# Patient Record
Sex: MALE | Race: ASIAN | NOT HISPANIC OR LATINO | Employment: FULL TIME | URBAN - METROPOLITAN AREA
[De-identification: names, ages, dates, MRNs, and addresses within clinical notes are randomized per-mention and may not be internally consistent; named-entity substitution may affect disease eponyms.]

---

## 2018-12-27 ENCOUNTER — OFFICE VISIT (OUTPATIENT)
Dept: FAMILY MEDICINE | Facility: CLINIC | Age: 28
End: 2018-12-27
Payer: COMMERCIAL

## 2018-12-27 ENCOUNTER — RECORDS - HEALTHEAST (OUTPATIENT)
Dept: ADMINISTRATIVE | Facility: OTHER | Age: 28
End: 2018-12-27

## 2018-12-27 VITALS
BODY MASS INDEX: 25.57 KG/M2 | HEIGHT: 64 IN | RESPIRATION RATE: 18 BRPM | TEMPERATURE: 98.4 F | WEIGHT: 149.8 LBS | SYSTOLIC BLOOD PRESSURE: 130 MMHG | OXYGEN SATURATION: 94 % | HEART RATE: 79 BPM | DIASTOLIC BLOOD PRESSURE: 86 MMHG

## 2018-12-27 DIAGNOSIS — J06.9 VIRAL URI WITH COUGH: Primary | ICD-10-CM

## 2018-12-27 DIAGNOSIS — Z00.00 HEALTHCARE MAINTENANCE: ICD-10-CM

## 2018-12-27 ASSESSMENT — MIFFLIN-ST. JEOR: SCORE: 1570.49

## 2018-12-27 NOTE — NURSING NOTE
Injectable influenza vaccine documentation    1. Has the patient received the information for the influenza vaccine? YES    2. Does the patient have a severe allergy to eggs (Patients with a severe egg allergy should be assessed by a medical provider, RN, or clinical pharmacist. If they receive the influenza vaccine, please have them observed for 15 minutes.)? No    3. Has the patient had an allergic reaction to previous influenza vaccines? No    4. Has the patient had any severe allergic reactions to past influenza vaccines ? No       5. Does patient have a history of Guillain-Alexandria syndrome? No      Based on responses above, I administered the influenza vaccine.  Jazlyn Gomez MA

## 2018-12-27 NOTE — PROGRESS NOTES
"HPI    Sore throat:  Started about a week ago  Was having some sweats - those resolved about 5d ago  Now cough and sore throat  No nasal congestion  Cough is productive  No fevers - never checked temperature when having sweat  Cough drops seem to be helping  Not coughing up any blood  Has been around a lot of sick people    Wants to establish clinic with [facility] - close to his house   Waiting for insurance to switch    Past Medical History  Medical conditions - None  Surgeries - None  Medications - None  Hospitalizations - Never  Allergies - NKA  Prior PCP - Unsure  Last outpatient PCP visit - Unsure    Family History  Mom - HTN  Dad - HTN  Siblings - Healthy  Children - None      Social History  Works as: [employer]  ADL: independent  Ambulation: independent  Lives with: parents  Smoking: Never  Alcohol: Drinks several times a week, 4-5 shots per  Illicit drug use: Uses MDMA every week or two    Complete ROS negative unless mentioned above in HPI    OBJECTIVE    Vitals  /86   Pulse 79   Temp 98.4  F (36.9  C) (Oral)   Resp 18   Ht 1.626 m (5' 4\")   Wt 67.9 kg (149 lb 12.8 oz)   SpO2 94%   BMI 25.71 kg/m        Physical Exam  General: No acute distress  Ears: canals patent, TM within normal limits  Eyes: EOMI, PERRLA  Nose: nasal mucosa moist, clear nasal discharche  Oral cavity: moist mucosa, no tonsillar exudates, tonsils not enlarged, erythematous pharynx   Neck: good ROM, supple, no apparent tracheal deviation, no adenopathy or tenderness  Respiratory: CTA bilaterally, no wheezes/rhonchi/rhales appreciated, no respiratory distress  Chest wall: No chest wall tenderness  Back: no paraspinal tenderness, no spinal tenderness, no vertebral step offs appreciated  Abdomen: soft, non-distended, non-tender, normoactive bowel sounds  Extremities: no cyanosis, no edema, capillary refill <2 seconds, well perfused  Neuro: no focal deficits noted, reflexes within normal limits  Psyche: " appropriate affect     Labs    Recommend Lipid screen, but will get done at [facility]    ASSESSMENT/PLAN    1. Viral URI with cough  Continue with conservative management. Discussed using tea with honey along with his cough drops and syrup. Does not have any points for Centor criteria so do not think strep swab is warranted. Fevers resolved.     2. Healthcare maintenance  Would recommend lipid screen, but patient would like to establish with [facility] for location. Waiting for insurance to switch. UTD on tetanus.   -Flu shot today    3. Alcohol/substance use  Patient open about his alcohol and MDMA use. Not affecting his work but still counseled on use - recommend decreasing alcohol use and stopping MDMA.       Precepted patient with Dr. Miryam Tee MD   [facility]  Pager # [phone]

## 2018-12-27 NOTE — PROGRESS NOTES
I have personally reviewed the history and examination as documented by Dr. Tee.  I was present during key portions of the visit and agree with the assessment and plan as documented for 28 yr old male here to establish care and for pharyngitis. Precautions given. Anticipatory guidance given. Health maintenance reviewed and immunizations given.    Tobias Witt MD  December 27, 2018  11:14 AM

## 2019-01-16 ENCOUNTER — OFFICE VISIT - HEALTHEAST (OUTPATIENT)
Dept: FAMILY MEDICINE | Facility: CLINIC | Age: 29
End: 2019-01-16

## 2019-01-16 ENCOUNTER — COMMUNICATION - HEALTHEAST (OUTPATIENT)
Dept: TELEHEALTH | Facility: CLINIC | Age: 29
End: 2019-01-16

## 2019-01-16 DIAGNOSIS — J40 BRONCHITIS: ICD-10-CM

## 2019-01-16 DIAGNOSIS — J02.9 PHARYNGITIS, UNSPECIFIED ETIOLOGY: ICD-10-CM

## 2019-01-16 ASSESSMENT — MIFFLIN-ST. JEOR: SCORE: 1583.61

## 2019-12-24 ENCOUNTER — OFFICE VISIT - HEALTHEAST (OUTPATIENT)
Dept: FAMILY MEDICINE | Facility: CLINIC | Age: 29
End: 2019-12-24

## 2019-12-24 DIAGNOSIS — J11.1 INFLUENZA-LIKE ILLNESS: ICD-10-CM

## 2019-12-24 DIAGNOSIS — R50.9 FEVER, UNSPECIFIED FEVER CAUSE: ICD-10-CM

## 2019-12-24 DIAGNOSIS — R07.0 THROAT PAIN: ICD-10-CM

## 2019-12-24 LAB
DEPRECATED S PYO AG THROAT QL EIA: NORMAL
FLUAV AG SPEC QL IA: NORMAL
FLUBV AG SPEC QL IA: NORMAL

## 2019-12-24 ASSESSMENT — MIFFLIN-ST. JEOR: SCORE: 1639.86

## 2019-12-25 LAB — GROUP A STREP BY PCR: NORMAL

## 2021-05-05 ENCOUNTER — AMBULATORY - HEALTHEAST (OUTPATIENT)
Dept: NURSING | Facility: CLINIC | Age: 31
End: 2021-05-05

## 2021-05-12 ENCOUNTER — OFFICE VISIT - HEALTHEAST (OUTPATIENT)
Dept: FAMILY MEDICINE | Facility: CLINIC | Age: 31
End: 2021-05-12

## 2021-05-12 DIAGNOSIS — R19.4 FREQUENT BOWEL MOVEMENTS: ICD-10-CM

## 2021-05-12 DIAGNOSIS — M54.50 CHRONIC BILATERAL LOW BACK PAIN WITHOUT SCIATICA: ICD-10-CM

## 2021-05-12 DIAGNOSIS — L30.9 DERMATITIS: ICD-10-CM

## 2021-05-12 DIAGNOSIS — G89.29 CHRONIC BILATERAL LOW BACK PAIN WITHOUT SCIATICA: ICD-10-CM

## 2021-05-12 DIAGNOSIS — Z00.00 ANNUAL PHYSICAL EXAM: ICD-10-CM

## 2021-05-12 LAB
CHOLEST SERPL-MCNC: 199 MG/DL
FASTING STATUS PATIENT QL REPORTED: YES
FASTING STATUS PATIENT QL REPORTED: YES
GLUCOSE BLD-MCNC: 79 MG/DL (ref 70–99)
HDLC SERPL-MCNC: 46 MG/DL
LDLC SERPL CALC-MCNC: 102 MG/DL
TRIGL SERPL-MCNC: 255 MG/DL

## 2021-05-12 RX ORDER — CLOTRIMAZOLE 1 %
CREAM (GRAM) TOPICAL
Qty: 24 G | Refills: 0 | Status: SHIPPED | OUTPATIENT
Start: 2021-05-12 | End: 2022-10-14

## 2021-05-12 ASSESSMENT — PATIENT HEALTH QUESTIONNAIRE - PHQ9: SUM OF ALL RESPONSES TO PHQ QUESTIONS 1-9: 20

## 2021-05-12 ASSESSMENT — MIFFLIN-ST. JEOR: SCORE: 1619.9

## 2021-05-13 ENCOUNTER — AMBULATORY - HEALTHEAST (OUTPATIENT)
Dept: LAB | Facility: CLINIC | Age: 31
End: 2021-05-13

## 2021-05-13 DIAGNOSIS — R19.4 FREQUENT BOWEL MOVEMENTS: ICD-10-CM

## 2021-05-14 LAB — H PYLORI AG STL QL IA: NEGATIVE

## 2021-05-27 VITALS
SYSTOLIC BLOOD PRESSURE: 112 MMHG | WEIGHT: 164 LBS | DIASTOLIC BLOOD PRESSURE: 70 MMHG | HEART RATE: 60 BPM | HEIGHT: 64 IN | BODY MASS INDEX: 28 KG/M2

## 2021-05-27 ASSESSMENT — PATIENT HEALTH QUESTIONNAIRE - PHQ9: SUM OF ALL RESPONSES TO PHQ QUESTIONS 1-9: 20

## 2021-06-02 ENCOUNTER — AMBULATORY - HEALTHEAST (OUTPATIENT)
Dept: NURSING | Facility: CLINIC | Age: 31
End: 2021-06-02

## 2021-06-02 VITALS — WEIGHT: 156 LBS | BODY MASS INDEX: 26.63 KG/M2 | HEIGHT: 64 IN

## 2021-06-04 VITALS
TEMPERATURE: 100.3 F | OXYGEN SATURATION: 97 % | BODY MASS INDEX: 28.75 KG/M2 | WEIGHT: 168.4 LBS | DIASTOLIC BLOOD PRESSURE: 78 MMHG | HEART RATE: 106 BPM | RESPIRATION RATE: 18 BRPM | HEIGHT: 64 IN | SYSTOLIC BLOOD PRESSURE: 126 MMHG

## 2021-06-04 NOTE — PROGRESS NOTES
"Chief Complaint   Patient presents with     Sore Throat     X2 days      Cough     X2 days      Fever     Chills. Did not check temps.      Ear Pain     Both ears        HPI:  Amilcar Sheriff is a 29 y.o. male who presents today with flu like symptoms since yesterday. He reports, nasal congestion, cough, ear pain/pressure, sore throat and fatigue. He feels feverish but has no body aches. He thinks his friend has something similar and was exposed to him. He denies shortness of breath, wheeze, chest pain, n/v/d/ abdominal pain.     Problem List:  There are no relevant problems documented for this patient.      No past medical history on file.    No current outpatient medications on file prior to visit.     No current facility-administered medications on file prior to visit.         Social History     Tobacco Use     Smoking status: Never Smoker     Smokeless tobacco: Never Used   Substance Use Topics     Alcohol use: Not on file       ROS:  As stated in HPI    Vitals:    12/24/19 1022   BP: 126/78   Patient Site: Right Arm   Patient Position: Sitting   Cuff Size: Adult Large   Pulse: (!) 106   Resp: 18   Temp: 100.3  F (37.9  C)   TempSrc: Oral   SpO2: 97%   Weight: 168 lb 6.4 oz (76.4 kg)   Height: 5' 4\" (1.626 m)       Physical Exam:  General: Alert, No apparent distress, Cooperative  SKIN: dry, warm, no rash or lesions seen in areas of exposed skin  HENT: HEAD: ATNC,   EYES: Conjunctiva clear, EOMI  EARS: TM pearly with out erythema or bulging bilaterally  NOSE: Nares partially occluded with mucopurulent matter, mucosa non erythematous, turbinates non swollen, sinuses discomfort  THROAT: No Posterior oropharynx erythema, tonsils 0+ bilaterally, no exudate, uvula midline, non occluded  NECK: Supple, non tender, no cervical adenopathy  LUNGS: No respiratory distress, retraction or flaring. clear lung sounds in all fields, no wheezes, rales, crackles or rhonchi   CV: tachycardia, no murmurs, rubs or gallops, no " cyanosis  NUERO: AOx3, normal mentation  PSYCH: normal mood and affect        No notes on file    Labs:  Recent Results (from the past 72 hour(s))   Rapid Strep A Screen-Throat swab   Result Value Ref Range    Rapid Strep A Antigen No Group A Strep detected, presumptive negative No Group A Strep detected, presumptive negative   Group A Strep, RNA Direct Detection, Throat   Result Value Ref Range    Group A Strep by PCR No Group A Strep rRNA detected No Group A Strep rRNA detected   Influenza A/B Rapid Test- Nasal Swab   Result Value Ref Range    Influenza  A, Rapid Antigen No Influenza A antigen detected No Influenza A antigen detected    Influenza B, Rapid Antigen No Influenza B antigen detected No Influenza B antigen detected         No results found.    Assessment and Plan:  1. Throat pain  Rapid Strep A Screen-Throat swab    Group A Strep, RNA Direct Detection, Throat   2. Fever, unspecified fever cause  Influenza A/B Rapid Test- Nasal Swab      Rapid flu negative but symptoms highly suggestive of influenza-like illness.  Will treat as such and prescribe oseltamivir as they likely will benefit from this antiviral.  Discussed that benefits outweigh the risk at this time.  Patient will follow-up if they are not improving ove expected course of the influenza recovery arc.   Simon Soto PA-C

## 2021-06-16 PROBLEM — E78.1 HYPERTRIGLYCERIDEMIA: Status: ACTIVE | Noted: 2021-05-29

## 2021-06-17 NOTE — PROGRESS NOTES
SUBJECTIVE:  Say Gina Sheriff is a 31 y.o. male here for a physical exam.    Other concerns:  1.  Low back pain.  He was seen at [facility] urgent care for low back pain on 3/2/2021.  I reviewed that note today.  I diagnosed him with acute midline low back pain without sciatica.  I reviewed x-ray report from that date.  X-ray did not show any acute or significant findings.  He was discharged with what sounds like a Medrol dose pack, naproxen, and a muscle relaxer.  He says those medicines did help, but when he ran out symptoms did eventually return.  He also went to 3 physical therapy sessions.  They help somewhat.  He is trying to do some exercises at home as well.  He was also seen at [facility] orthopedics on 3/3/2021.  I reviewed that note today.  They had essentially the same plan.  They said he also in addition to low back pain with slight degenerative changes, he had tight hamstring and hip flexors.  They asked him to follow-up in 3 weeks and consider an MRI if symptoms or not improving.  Patient found out that his insurance would not cover visits with this provider, so he needs to see someone new for his back.    2.  Skin rash.  He has a rash that comes and goes under his arms.  Now it is starting to come in the groin area.  It is itchy.  He does have just normal over-the-counter lotion that seems to help a little bit.  He is wondering if anything can be done for this.    3.  Frequent bowel movements.  He says he usually has a bowel movement 3 times a day, usually somewhat liquidy.  Is been that way for a long time.  No personal or family history of H. pylori.  He cannot specifically connected with any foods or other triggers.    4.  Possible depression.  He feels like he has been more depressed recently.  He says he stopped doing social things, stopped going out with his friends.  Of course some of this is due to restrictions from pandemic.  He just went through a break-up, which he thinks is the primary reason  "for his change in mood.  He is working from home, that's going well.  PHQ-9 = 18.  He answered \"several days\" to the question thought of harming myself that occurred to me.  We reviewed that specifically today.  He says he more has some passive thoughts about what it would be like if he were not here.  He has no active plan.  He thinks that he just needs time to adjust.    There is no problem list on file for this patient.      Current Outpatient Medications:      clotrimazole (LOTRIMIN) 1 % cream, Apply to affected areas 2 times a day, as needed for rash., Disp: 24 g, Rfl: 0    History reviewed. No pertinent past medical history.    History reviewed. No pertinent surgical history.    Social History     Occupational History     Not on file   Tobacco Use     Smoking status: Passive Smoke Exposure - Never Smoker     Smokeless tobacco: Never Used   Substance and Sexual Activity     Alcohol use: Not on file     Drug use: Not on file     Sexual activity: Not on file       No family history on file.    REVIEW OF SYSTEMS:  Complete review of systems was discussed with patient and is negative except as noted above.    OBJECTIVE:  Vitals:    05/12/21 0821   BP: 112/70   Pulse: 60     Body mass index is 28.15 kg/m .    GENERAL:  Patient alert and oriented x 3, in no apparent distress, appropriately groomed with normal affect  HEENT, lymphatic, cardiac, pulmonary, abdomen, musculoskeletal, neuro exams were completed today and grossly normal.  : Normal male exam, no testicular masses, no lesions  SKIN: Mild erythematous rash present on right axillary area, no significant rash on the left side, no significant rash in the right inguinal area    Results for orders placed or performed in visit on 05/12/21   Glucose   Result Value Ref Range    Glucose 79 70 - 99 mg/dL    Patient Fasting > 8hrs? Yes    Other labs pending.    ASSESSMENT & PLAN:    1. Annual physical exam  Health maintenance discussed with patient is appropriate for " age and risk factors.  I will follow-up with screening labs.  - Lipid Cascade  - Glucose    2. Chronic bilateral low back pain without sciatica  Likely musculoskeletal in origin.  No red flags on exam or history.  He was following with [facility] orthopedics.  However his insurance will not cover that.  We discussed doing just physical therapy versus spine care.  He would like to see spine care.  Referral placed today.  Continue with symptomatic treatment for now.  - Ambulatory referral to Sp[facility]    3. Dermatitis  Suspect tinea based on locations where it is bothering him.  Start with topical medication follow-up in 2 to 4 weeks.  Discussed other lifestyle modifications.  - clotrimazole (LOTRIMIN) 1 % cream; Apply to affected areas 2 times a day, as needed for rash.  Dispense: 24 g; Refill: 0    4. Frequent bowel movements  No known exposures to H. pylori.  We discussed monitoring versus testing.  He would like to test.  He was provided with stool kit before he left.  Also discussed monitoring to see if certain foods make things worse, such as dairy.  - H. pylori Antigen, Stool(HPSAG); Future    5.  Adjustment disorder with depressed mood  Notes he feels like he might be mildly depressed recently.  He went through a break-up.  He says he is stopped doing things and going out with friends.  He thinks he just needs some time to better.  He is working from home and that is going fine.  He declines offer for therapy.  He declines medication.  Patient denies any active thoughts of wanting to hurt him/herself or others and does contract for safety.  If his symptoms worsen, or he would like to use something else for treatment, he will contact us.    The following high BMI interventions were performed this visit: encouragement to exercise and lifestyle education regarding diet    This dictation uses voice recognition software, which may contain typographical errors.

## 2021-06-23 NOTE — PROGRESS NOTES
"Subjective: Patient comes in clinic for the first time this 28-year-old male has a history of cough congestion he said sore throat is now coughing up some yellowish phlegm he did have one episode of slight blood that he coughed up a week or so ago    Symptoms been going on for 4 weeks initially got worse then got better now got worse again.  He does not smoke does drink alcohol    Past medical history no surgeries or hospitalizations or medical conditions no allergies to medicines he is taken some Mucinex over-the-counter otherwise negative    The family history is noncontributory.    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    No additional concerns or issues no vomiting or diarrhea he did have some chills in the beginning no fever at this time    Tobacco status: He  reports that  has never smoked. he has never used smokeless tobacco.    There are no active problems to display for this patient.      Current Outpatient Medications   Medication Sig Dispense Refill     azithromycin (ZITHROMAX Z-SUNSHINE) 250 MG tablet Take 2 tablets (500 mg) on  Day 1,  followed by 1 tablet (250 mg) once daily on Days 2 through 5. 6 tablet 0     No current facility-administered medications for this visit.        ROS:   10 point review of systems positive as outlined otherwise negative    Objective:    /84 (Patient Site: Right Arm, Patient Position: Sitting, Cuff Size: Adult Regular)   Pulse 82   Temp 97.2  F (36.2  C) (Oral)   Resp 16   Ht 5' 4\" (1.626 m)   Wt 156 lb (70.8 kg)   SpO2 97%   BMI 26.78 kg/m    Body mass index is 26.78 kg/m .      General appearance no acute distress    Neck with minimal anterior adenopathy oropharynx had slight erythema no exudate    Canals and TMs normal    Nose with some mild congestion    Lungs coarse breath sounds no rales or rhonchi O2 sat 97%    Heart was regular rate in the 80s    Skin was normal no rashes    Extremities without edema.        No results found for this or any previous " visit.    Assessment:  1. Bronchitis  azithromycin (ZITHROMAX Z-SUNSHINE) 250 MG tablet   2. Pharyngitis, unspecified etiology       Patient was treated with azithromycin pack push fluids get rest follow-up if not improved    Plan: As discussed    This transcription uses voice recognition software, which may contain typographical errors.

## 2021-08-24 ENCOUNTER — OFFICE VISIT (OUTPATIENT)
Dept: FAMILY MEDICINE | Facility: CLINIC | Age: 31
End: 2021-08-24
Payer: COMMERCIAL

## 2021-08-24 VITALS
DIASTOLIC BLOOD PRESSURE: 74 MMHG | OXYGEN SATURATION: 97 % | SYSTOLIC BLOOD PRESSURE: 120 MMHG | HEART RATE: 58 BPM | RESPIRATION RATE: 16 BRPM

## 2021-08-24 DIAGNOSIS — M25.532 LEFT WRIST PAIN: Primary | ICD-10-CM

## 2021-08-24 PROCEDURE — 99213 OFFICE O/P EST LOW 20 MIN: CPT | Performed by: PHYSICIAN ASSISTANT

## 2021-08-24 RX ORDER — NAPROXEN 500 MG/1
500 TABLET ORAL 2 TIMES DAILY WITH MEALS
Qty: 60 TABLET | Refills: 0 | Status: SHIPPED | OUTPATIENT
Start: 2021-08-24 | End: 2022-10-14

## 2021-08-24 ASSESSMENT — ENCOUNTER SYMPTOMS
JOINT SWELLING: 0
WEAKNESS: 0
CHILLS: 0
NUMBNESS: 0
FEVER: 0
COLOR CHANGE: 0
ARTHRALGIAS: 1

## 2021-08-24 NOTE — PATIENT INSTRUCTIONS
1) Take Naproxen every 12 hours as needed for pain. If this is not sufficient for pain control it may be combined with Tylenol up to 1000 mg four times a day.   2) Ice to the affected area 20 minutes three times daily.  3) Wear wrist brace during the day. If you note any pain in the morning then wear your wrist brace while you sleep too.   4) Follow up in 2 week if not improving, sooner if worsening.

## 2021-08-24 NOTE — PROGRESS NOTES
Patient presents with:  Wrist Pain: x3wks, L wrist, don't recall any injury      Clinical Decision Making: Suspect tendinitis of the flexor tendon of the wrist. No physical exam findings consistent with carpal tunnel and patient denies any numbness or tingling. Pain is more on the medial aspect not over the carpal tunnel directly. No findings indicative of gout. Low suspicion for any fractures, as this is nontraumatic. Recommend immobilization in nonthumb spica brace to help with tendinitis protection.      ICD-10-CM    1. Left wrist pain  M25.532 Wrist/Arm/Hand Supplies Order for DME - ONLY FOR DME     naproxen (NAPROSYN) 500 MG tablet       Patient Instructions   1) Take Naproxen every 12 hours as needed for pain. If this is not sufficient for pain control it may be combined with Tylenol up to 1000 mg four times a day.   2) Ice to the affected area 20 minutes three times daily.  3) Wear wrist brace during the day. If you note any pain in the morning then wear your wrist brace while you sleep too.   4) Follow up in 2 week if not improving, sooner if worsening.        HPI:  Amilcar Sheriff is a 31 year old male who presents today complaining of left wrist pain for the past 3 weeks. No known injury. No swelling, redness, or deformity noted. He is otherwise been feeling well and healthy. 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 He denies any numbness or tingling of the fingers. He has not any weakness. He states that certain movements or lifting aggravate his symptoms. Supination seems to be worst movement. He denies taking any medications for his symptoms. He thought maybe it was related to lifting dumbbells.    History obtained from the patient.    Problem List:  2021-05: Hypertriglyceridemia      No past medical history on file.    Social History     Tobacco Use     Smoking status: Passive Smoke Exposure - Never Smoker     Smokeless tobacco: Never Used   Substance Use Topics     Alcohol use: Not on file        Review of Systems   Constitutional: Negative for chills and fever.   Musculoskeletal: Positive for arthralgias. Negative for joint swelling.   Skin: Negative for color change and rash.   Neurological: Negative for weakness and numbness.   All other systems reviewed and are negative.      Vitals:    08/24/21 1030   BP: 120/74   Pulse: 58   Resp: 16   SpO2: 97%       Physical Exam  Vitals and nursing note reviewed.   Constitutional:       General: He is not in acute distress.     Appearance: He is not toxic-appearing or diaphoretic.   HENT:      Head: Normocephalic and atraumatic.      Right Ear: External ear normal.      Left Ear: External ear normal.   Eyes:      Conjunctiva/sclera: Conjunctivae normal.   Pulmonary:      Effort: Pulmonary effort is normal. No respiratory distress.   Musculoskeletal:      Left wrist: Tenderness (Medial Flexeril tendon of the wrist.) present. No swelling, deformity, effusion, lacerations, bony tenderness, snuff box tenderness or crepitus. Normal range of motion. Normal pulse.      Comments: Negative Hamida sign   Skin:     General: Skin is warm.      Findings: No erythema or rash.   Neurological:      Mental Status: He is alert.   Psychiatric:         Mood and Affect: Mood normal.         Behavior: Behavior normal.         Thought Content: Thought content normal.         Judgment: Judgment normal.       At the end of the encounter, I discussed results, diagnosis, medications. Discussed red flags for immediate return to clinic/ER, as well as indications for follow up if no improvement. Patient understood and agreed to plan. Patient was stable for discharge.

## 2021-09-19 ENCOUNTER — HEALTH MAINTENANCE LETTER (OUTPATIENT)
Age: 31
End: 2021-09-19

## 2021-09-30 ENCOUNTER — TELEPHONE (OUTPATIENT)
Dept: FAMILY MEDICINE | Facility: CLINIC | Age: 31
End: 2021-09-30

## 2021-09-30 NOTE — TELEPHONE ENCOUNTER
There are Covid shots in the pt's chart, but they were not done here. Can I print off [facility] for him?

## 2021-09-30 NOTE — TELEPHONE ENCOUNTER
Reason for Call:  Other     Detailed comments: pt would like documentation  stating he had Covid Vaccines done  He will  at   Phone Number Patient can be reached at: Home number on file [phone] (home)    Best Time: anytime  Can we leave a detailed message on this number? YES    Call taken on 9/30/2021 at 2:12 PM by Shikha Reno

## 2022-06-26 ENCOUNTER — HEALTH MAINTENANCE LETTER (OUTPATIENT)
Age: 32
End: 2022-06-26

## 2022-10-12 ENCOUNTER — HOSPITAL ENCOUNTER (EMERGENCY)
Facility: HOSPITAL | Age: 32
Discharge: HOME | End: 2022-10-12
Admitting: PHYSICIAN ASSISTANT
Payer: COMMERCIAL

## 2022-10-12 ENCOUNTER — E-VISIT (OUTPATIENT)
Dept: FAMILY MEDICINE | Facility: CLINIC | Age: 32
End: 2022-10-12
Payer: COMMERCIAL

## 2022-10-12 VITALS
WEIGHT: 175 LBS | SYSTOLIC BLOOD PRESSURE: 132 MMHG | HEART RATE: 59 BPM | RESPIRATION RATE: 16 BRPM | OXYGEN SATURATION: 98 % | TEMPERATURE: 98 F | DIASTOLIC BLOOD PRESSURE: 78 MMHG | BODY MASS INDEX: 31.01 KG/M2 | HEIGHT: 63 IN

## 2022-10-12 DIAGNOSIS — S39.012A BACK STRAIN, INITIAL ENCOUNTER: ICD-10-CM

## 2022-10-12 DIAGNOSIS — M54.50 LOW BACK PAIN, UNSPECIFIED BACK PAIN LATERALITY, UNSPECIFIED CHRONICITY, UNSPECIFIED WHETHER SCIATICA PRESENT: Primary | ICD-10-CM

## 2022-10-12 PROCEDURE — 250N000013 HC RX MED GY IP 250 OP 250 PS 637: Performed by: PHYSICIAN ASSISTANT

## 2022-10-12 PROCEDURE — 96372 THER/PROPH/DIAG INJ SC/IM: CPT | Performed by: PHYSICIAN ASSISTANT

## 2022-10-12 PROCEDURE — 250N000011 HC RX IP 250 OP 636: Performed by: PHYSICIAN ASSISTANT

## 2022-10-12 PROCEDURE — 99284 EMERGENCY DEPT VISIT MOD MDM: CPT

## 2022-10-12 PROCEDURE — 99207 PR NON-BILLABLE SERV PER CHARTING: CPT | Performed by: PHYSICIAN ASSISTANT

## 2022-10-12 RX ORDER — NAPROXEN 500 MG/1
500 TABLET ORAL 2 TIMES DAILY WITH MEALS
Qty: 28 TABLET | Refills: 0 | Status: SHIPPED | OUTPATIENT
Start: 2022-10-12 | End: 2022-10-26

## 2022-10-12 RX ORDER — DIAZEPAM 5 MG
5 TABLET ORAL ONCE
Status: COMPLETED | OUTPATIENT
Start: 2022-10-12 | End: 2022-10-12

## 2022-10-12 RX ORDER — KETOROLAC TROMETHAMINE 30 MG/ML
30 INJECTION, SOLUTION INTRAMUSCULAR; INTRAVENOUS ONCE
Status: COMPLETED | OUTPATIENT
Start: 2022-10-12 | End: 2022-10-12

## 2022-10-12 RX ORDER — DIAZEPAM 5 MG
5 TABLET ORAL EVERY 6 HOURS PRN
Qty: 10 TABLET | Refills: 0 | Status: SHIPPED | OUTPATIENT
Start: 2022-10-12 | End: 2023-08-29

## 2022-10-12 RX ORDER — ACETAMINOPHEN 325 MG/1
975 TABLET ORAL ONCE
Status: COMPLETED | OUTPATIENT
Start: 2022-10-12 | End: 2022-10-12

## 2022-10-12 RX ADMIN — DIAZEPAM 5 MG: 5 TABLET ORAL at 22:23

## 2022-10-12 RX ADMIN — KETOROLAC TROMETHAMINE 30 MG: 30 INJECTION, SOLUTION INTRAMUSCULAR at 22:23

## 2022-10-12 RX ADMIN — ACETAMINOPHEN 975 MG: 325 TABLET, FILM COATED ORAL at 22:23

## 2022-10-12 ASSESSMENT — ENCOUNTER SYMPTOMS
BACK PAIN: 1
FEVER: 0
CHILLS: 0
WEAKNESS: 0
NUMBNESS: 0

## 2022-10-12 NOTE — PATIENT INSTRUCTIONS
Thank you for choosing us for your care. Based on the information provided, you need to be seen in person for an office visit.  Please go to Urgent Care, the ER, or schedule an office visit with a provider, depending on how bothersome your pain is.     You will not be charged for this eVisit.

## 2022-10-13 NOTE — ED TRIAGE NOTES
"Patient arrives with back pain that began 2 days ago. Started off as generalize soreness, today having spasms. Took 2 ibuprofen prior to arrival without relief. Has had one prior episode in the past and was told to \"stretch\" and had that episode after driving 6+ hours with poor posture. Believes this is related to poor posture playing video games on laptop. Denies any recent fall or injury to area.      Triage Assessment       Row Name 10/12/22 2020       Triage Assessment (Adult)    Airway WDL WDL       Respiratory WDL    Respiratory WDL WDL       Skin Circulation/Temperature WDL    Skin Circulation/Temperature WDL WDL       Cardiac WDL    Cardiac WDL WDL       Peripheral/Neurovascular WDL    Peripheral Neurovascular WDL WDL       Cognitive/Neuro/Behavioral WDL    Cognitive/Neuro/Behavioral WDL WDL                  "

## 2022-10-13 NOTE — ED PROVIDER NOTES
Emergency Department Encounter   NAME: Amilcar Sheriff ; AGE: 32 year old male ; YOB: 1990 ; MRN: [id number] ; PCP: [facility]   ED PROVIDER: Alisa Sandoval PA-C    Evaluation Date & Time:   No admission date for patient encounter.    CHIEF COMPLAINT:  Back Pain      Impression and Plan   MDM: Amilcar Sheriff is a 32 year old male with a pertinent history of hypertriglyceridemia, who presents to the ED by private vehicle for evaluation of back pain.  The patient presents to the emergency department for evaluation of nontraumatic low back pain, that has been sore for several days, although is now more stiff and spasming after sitting in the same position for several hours while playing videogames.  Upon arrival to the ER, he appears uncomfortable, stiff with certain movements, and has a slow gait.  He has tenderness over his bilateral lumbar paraspinal musculature.  No recent falls or trauma to the back, or midline tenderness or step-offs.  Nothing to suggest compression fracture.  No IV drug abuse, immunosuppression, fevers or infectious symptoms.  No concerns at this time for epidural abscess, discitis, or osteomyelitis.  Nothing to suggest epidural hematoma.  Patient is neurovascularly intact and he is not experiencing any red flag symptoms to suggest cauda equina syndrome.  With only several days of pain in a young healthy 32-year-old with tenderness over the muscle, and spasms, no indication for emergent spinal imaging.  Suspect symptoms are due to back muscular strain and spasm from position and posterior.  Patient was given IM Toradol and Valium with some relief in the ER.  We will discharge him home with a course of naproxen, oral Valium, and plan for gentle stretching, ice and heat for home.  He does have a primary care clinic, and we discussed plan for follow-up in 1 to 2 weeks to make sure symptoms are improving as well as concerning signs and symptoms to return to the  "ER.  He verbalized understanding is comfortable plan.  Discharged home in good condition.    ED COURSE:  11:06 PM I met and introduced myself to the patient. I gathered initial history and performed my physical exam. We discussed plan for discharge, follow up and reasons to return to the ED.     At the conclusion of the encounter I discussed the results of all the tests and the disposition. The questions were answered. The patient or family acknowledged understanding and was agreeable with the care plan.    FINAL IMPRESSION:    ICD-10-CM    1. Back strain, initial encounter  S39.012A             MEDICATIONS GIVEN IN THE EMERGENCY DEPARTMENT:  Medications   ketorolac (TORADOL) injection 30 mg (30 mg Intramuscular Given 10/12/22 2223)   diazepam (VALIUM) tablet 5 mg (5 mg Oral Given 10/12/22 2223)   acetaminophen (TYLENOL) tablet 975 mg (975 mg Oral Given 10/12/22 2223)         NEW PRESCRIPTIONS STARTED AT TODAY'S ED VISIT:  New Prescriptions    DIAZEPAM (VALIUM) 5 MG TABLET    Take 1 tablet (5 mg) by mouth every 6 hours as needed for sleep or muscle spasms    NAPROXEN (NAPROSYN) 500 MG TABLET    Take 1 tablet (500 mg) by mouth 2 times daily (with meals) for 14 days         HPI   Patient information was obtained from: Patient    Use of Intrepreter: N/A    Amilcar Sheriff is a 32 year old male with a pertinent history of hypertriglyceridemia, who presents to the ED by private vehicle for evaluation of back pain.     The patient presents to the emergency department for evaluation of low back pain.  He states that he experiences low back pain from time to time, and has had some soreness to the area over the past several days.  Today, he was sitting with his feet up, and his back against the wall, \"rigoberto\" for several hours.  When he went to stand up, he had much more severe pain in his back as well as pulling and tightness.  He has been experiencing intermittent spasm of the area, and tried ibuprofen at home with minimal " "relief.  He has not had any recent fevers, chills, unintentional weight loss, or night sweats.  No IV drug abuse.  No numbness in his groin, loss of control of his bladder or bowel, or weakness or numbness in his legs.  No recent falls or trauma to the back.  No urinary symptoms.    REVIEW OF SYSTEMS:  Review of Systems   Constitutional: Negative for chills and fever.   Genitourinary: Negative.    Musculoskeletal: Positive for back pain.   Skin: Negative for rash.   Neurological: Negative for weakness and numbness.   All other systems reviewed and are negative.        Medical History     No past medical history on file.    Past Surgical History:   Procedure Laterality Date     NO HISTORY OF SURGERY         No family history on file.    Social History     Tobacco Use     Smoking status: Passive Smoke Exposure - Never Smoker     Smokeless tobacco: Never       diazepam (VALIUM) 5 MG tablet  naproxen (NAPROSYN) 500 MG tablet  clotrimazole (LOTRIMIN) 1 % cream  naproxen (NAPROSYN) 500 MG tablet          Physical Exam     First Vitals:  Patient Vitals for the past 24 hrs:   BP Temp Temp src Pulse Resp SpO2 Height Weight   10/12/22 2026 -- -- -- -- -- -- 1.6 m (5' 3\") 79.4 kg (175 lb)   10/12/22 2018 134/81 98  F (36.7  C) Temporal 79 19 94 % -- --         PHYSICAL EXAM:   Physical Exam  Vitals and nursing note reviewed.   Constitutional:       General: He is not in acute distress.     Appearance: He is not toxic-appearing.      Comments: Appears uncomfortable. Stiff with movement. Slow gait.    HENT:      Head: Normocephalic.   Eyes:      Conjunctiva/sclera: Conjunctivae normal.   Pulmonary:      Effort: Pulmonary effort is normal.   Musculoskeletal:      Comments: No midline spinal tenderness or palpable bony step-offs.  No rashes or skin lesions to the back.  He does have tenderness over his bilateral lumbar paraspinal musculature.  No active spasm.  Dorsiflexion and plantarflexion are intact.  5 out of 5 strength " against resistance with hip flexion, knee flexion extension.  Sensation to light touch intact bilateral lower extremities.  2+ patellar reflexes.   Neurological:      Mental Status: He is alert.             Results     LAB:  All pertinent labs reviewed and interpreted  Labs Ordered and Resulted from Time of ED Arrival to Time of ED Departure - No data to display    RADIOLOGY:  No orders to display       Alisa Sandoval PA-C   Emergency Medicine   [facility] EMERGENCY DEPARTMENT       Alisa Sandoval PA-C  10/12/22 1901

## 2022-10-13 NOTE — DISCHARGE INSTRUCTIONS
As we discussed, I suspect your pain today is due to a strain of the muscles in your low back.  Please continue to move throughout the day and attempt gentle stretching.  Use ice or heat.  You may use naproxen and Tylenol for pain relief at home.  I will also send you home with a prescription for Valium which is a muscle relaxant.  This is a strong medicine and can make you drowsy.  Please do not take this or working, driving, or operating heavy machinery.  If it anytime you develop a fever, numbness in your groin, loss of control of your bladder or bowel, weakness or numbness in your legs, or any new or concerning symptoms please return to the ER for further evaluation.  Otherwise, please follow-up in your primary care clinic to make sure this is improving within the next 1 to 2 weeks.

## 2022-10-14 ENCOUNTER — ANCILLARY PROCEDURE (OUTPATIENT)
Dept: GENERAL RADIOLOGY | Facility: CLINIC | Age: 32
End: 2022-10-14
Attending: FAMILY MEDICINE
Payer: COMMERCIAL

## 2022-10-14 ENCOUNTER — OFFICE VISIT (OUTPATIENT)
Dept: FAMILY MEDICINE | Facility: CLINIC | Age: 32
End: 2022-10-14
Payer: COMMERCIAL

## 2022-10-14 VITALS
SYSTOLIC BLOOD PRESSURE: 115 MMHG | WEIGHT: 173 LBS | DIASTOLIC BLOOD PRESSURE: 76 MMHG | TEMPERATURE: 97.8 F | HEART RATE: 62 BPM | BODY MASS INDEX: 30.65 KG/M2 | RESPIRATION RATE: 20 BRPM | OXYGEN SATURATION: 97 %

## 2022-10-14 DIAGNOSIS — Z11.59 NEED FOR HEPATITIS C SCREENING TEST: ICD-10-CM

## 2022-10-14 DIAGNOSIS — M25.551 HIP PAIN, RIGHT: ICD-10-CM

## 2022-10-14 DIAGNOSIS — M54.50 ACUTE BILATERAL LOW BACK PAIN WITHOUT SCIATICA: Primary | ICD-10-CM

## 2022-10-14 DIAGNOSIS — Z11.4 SCREENING FOR HIV (HUMAN IMMUNODEFICIENCY VIRUS): ICD-10-CM

## 2022-10-14 PROCEDURE — 90471 IMMUNIZATION ADMIN: CPT | Performed by: FAMILY MEDICINE

## 2022-10-14 PROCEDURE — 90715 TDAP VACCINE 7 YRS/> IM: CPT | Performed by: FAMILY MEDICINE

## 2022-10-14 PROCEDURE — 99213 OFFICE O/P EST LOW 20 MIN: CPT | Mod: 25 | Performed by: FAMILY MEDICINE

## 2022-10-14 PROCEDURE — 73502 X-RAY EXAM HIP UNI 2-3 VIEWS: CPT | Mod: TC | Performed by: RADIOLOGY

## 2022-10-14 NOTE — PROGRESS NOTES
Office Visit  [facility]  Date of Service: Oct 14, 2022      Subjective   Amilcar Sheriff is a 32 year old male who presents for   Chief Complaint   Patient presents with     Back Pain     Started 2 days ago.     Amilcar Fuentes is here today for evaluation of lower back pain for two days.    He first noticed back pain at age 21.  Whenever he is in good shape physically, he gets left-sided sciatica.  He was a runner in adolescence and symptoms started gradually.  They have never gone away entirely, though symptoms are less severe when he is not in good physical condition.  He notes that he was in the [employer] and would develop numbness of his left great toe when standing at attention for long periods of time.    In 2019, he had his first episode of back pain that was similar to what he has today.  He was playing with kids, throwing them up in the air, and the next day he woke up with a sore back.  There was no obvious injury.  Since that episode of pain in 2019, whenever he does a dead-hang, he has severe pain in the same spot.     In 2020, he had a recurrent episode after driving back from [place].  This pain was the same.  Muscles felt tight and he was unable to walk due to the severity of pain.    Since that time, he has had some mild soreness of the low back, nothing severe, except when doing a dead-hang.    The most recent episode started acutely on 10/12/2022.  He was sitting with legs up in bed, back straight with a pillow supporting his back.  When he stood up, he had severe pain in the lower back.  He states that the pain was 6-7 out of 10 initially and gradually got worse over a couple of hours.  After laying on his stomach for a while, the pain went away, but that when he started to walk around again the pain came back.  He was seen in the emergency room on 10/12/2022 due to the severity of the pain.    He states that the quality of the pain is difficult to explain,  but feels sharp or throbbing.  Certain movements tend to exacerbate the pain.  He has no bowel or bladder dysfunction.  No weakness or numbness.    Treatment: When he first had this type of pain in 2019, he saw an orthopedic specialist and was told to do exercises.  He did physical therapy at that time and has continued the exercises.    He has no history of significant back injury.    He notes that for about the last 6 months he has not been able to do squats because of right inguinal hip pain.  He has been doing physical therapy exercises that he found online for it and that symptom seems to be getting better.    SH: He has never smoked, he binge drinks on the weekend.  He is not concerned about excessive alcohol use.  FH: dad has back pain    Answers for HPI/ROS submitted by the patient on 10/14/2022  Your back pain is: recurring  Where is your back pain located? : right lower back, left lower back, right hip  How would you describe your back pain? : gnawing, sharp  Where does your back pain spread? : nowhere  Since you noticed your back pain, how has it changed? : gradually improving  Does your back pain interfere with your job?: Yes  How many servings of fruits and vegetables do you eat daily?: 0-1  On average, how many sweetened beverages do you drink each day (Examples: soda, juice, sweet tea, etc.  Do NOT count diet or artificially sweetened beverages)?: 0  How many minutes a day do you exercise enough to make your heart beat faster?: 30 to 60  How many days a week do you exercise enough to make your heart beat faster?: 4  How many days per week do you miss taking your medication?: 0    Objective   /76 (BP Location: Left arm, Patient Position: Sitting, Cuff Size: Adult Large)   Pulse 62   Temp 97.8  F (36.6  C) (Temporal)   Resp 20   Wt 78.5 kg (173 lb)   SpO2 97%   BMI 30.65 kg/m   He reports that he is a non-smoker but has been exposed to tobacco smoke. He has never used smokeless  tobacco.    Gen: Alert, no apparent distress.  Healthy-appearing.  Walks with a stiff gait and is guarded when getting up and down from the chair.  Back: Inspection-loss of lumbar lordosis.  No skin abnormalities.  Palpation-no step-off or deformity.  Mild muscle spasm on the left side and minimal on the right.  Straight leg raises negative.    Assessment & Plan     1. Acute on chronic lower back pain.  Atypical findings include pain with hanging, worsening pain, lack of significant muscle spasm on physical exam.  Right anterior hip pain may be related.  Pain is adequately controlled at this time.  Plan MRI lumbar spine and x-ray right hip with follow-up as indicated.  2. Health maintenance: Tdap given.      Order Summary                                                      Acute bilateral low back pain without sciatica  -     MR Lumbar Spine w/o Contrast; Future    Screening for HIV (human immunodeficiency virus)  -     HIV Antigen Antibody Combo; Future    Need for hepatitis C screening test  -     Hepatitis C Screen Reflex to HCV RNA Quant and Genotype; Future    Hip pain, right  -     XR Hip Right 2-3 Views; Future    Other orders  -     REVIEW OF HEALTH MAINTENANCE PROTOCOL ORDERS  -     TDAP VACCINE (Adacel, Boostrix)  -     INFLUENZA VACCINE IM > 6 MONTHS VALENT IIV4 (AFLURIA/FLUZONE)  -     COVID-19,PF,PFIZER BOOSTER BIVALENT (12+YRS)        Immunizations Administered     Name Date Dose VIS Date Route    Tdap (Adacel,Boostrix) 10/14/22  9:54 AM 0.5 mL 08/06/2021, Given Today Intramuscular          Future Appointments   Date Time Provider Department Center   10/14/2022  9:40 AM Medina Feliciano MD [facility]       Completed by: Medina Feliciano M.D., [facility]. 10/14/2022 9:30 AM.  This transcription uses voice recognition software, which may contain typographical errors.  MDM: [facility] neighborhood: [place] [address], language: English.

## 2022-10-18 ENCOUNTER — HOSPITAL ENCOUNTER (OUTPATIENT)
Dept: MRI IMAGING | Facility: HOSPITAL | Age: 32
Discharge: HOME | End: 2022-10-18
Attending: FAMILY MEDICINE | Admitting: FAMILY MEDICINE
Payer: COMMERCIAL

## 2022-10-18 DIAGNOSIS — M54.50 ACUTE BILATERAL LOW BACK PAIN WITHOUT SCIATICA: ICD-10-CM

## 2022-10-18 PROCEDURE — 72148 MRI LUMBAR SPINE W/O DYE: CPT

## 2022-10-19 PROBLEM — M51.16 LUMBAR DISC HERNIATION WITH RADICULOPATHY: Status: ACTIVE | Noted: 2022-10-19

## 2022-11-21 ENCOUNTER — HEALTH MAINTENANCE LETTER (OUTPATIENT)
Age: 32
End: 2022-11-21

## 2023-07-09 ENCOUNTER — HEALTH MAINTENANCE LETTER (OUTPATIENT)
Age: 33
End: 2023-07-09

## 2023-08-28 ASSESSMENT — ENCOUNTER SYMPTOMS
SHORTNESS OF BREATH: 0
JOINT SWELLING: 0
NAUSEA: 0
FREQUENCY: 0
ARTHRALGIAS: 0
PALPITATIONS: 0
WEAKNESS: 0
HEMATURIA: 0
DIARRHEA: 0
HEADACHES: 0
HEARTBURN: 0
ABDOMINAL PAIN: 0
SORE THROAT: 0
COUGH: 0
HEMATOCHEZIA: 0
DIZZINESS: 0
CHILLS: 0
PARESTHESIAS: 0
DYSURIA: 0
NERVOUS/ANXIOUS: 0
EYE PAIN: 0
FEVER: 0
CONSTIPATION: 0
MYALGIAS: 0

## 2023-08-29 ENCOUNTER — OFFICE VISIT (OUTPATIENT)
Dept: FAMILY MEDICINE | Facility: CLINIC | Age: 33
End: 2023-08-29
Payer: COMMERCIAL

## 2023-08-29 VITALS
OXYGEN SATURATION: 97 % | HEART RATE: 57 BPM | TEMPERATURE: 98.3 F | DIASTOLIC BLOOD PRESSURE: 77 MMHG | WEIGHT: 168.75 LBS | HEIGHT: 64 IN | BODY MASS INDEX: 28.81 KG/M2 | RESPIRATION RATE: 16 BRPM | SYSTOLIC BLOOD PRESSURE: 118 MMHG

## 2023-08-29 DIAGNOSIS — Z00.00 ROUTINE GENERAL MEDICAL EXAMINATION AT A HEALTH CARE FACILITY: Primary | ICD-10-CM

## 2023-08-29 DIAGNOSIS — Z13.228 SCREENING FOR ENDOCRINE, NUTRITIONAL, METABOLIC AND IMMUNITY DISORDER: ICD-10-CM

## 2023-08-29 DIAGNOSIS — M51.16 LUMBAR DISC HERNIATION WITH RADICULOPATHY: ICD-10-CM

## 2023-08-29 DIAGNOSIS — Z13.21 SCREENING FOR ENDOCRINE, NUTRITIONAL, METABOLIC AND IMMUNITY DISORDER: ICD-10-CM

## 2023-08-29 DIAGNOSIS — Z13.0 SCREENING FOR ENDOCRINE, NUTRITIONAL, METABOLIC AND IMMUNITY DISORDER: ICD-10-CM

## 2023-08-29 DIAGNOSIS — Z13.29 SCREENING FOR ENDOCRINE, NUTRITIONAL, METABOLIC AND IMMUNITY DISORDER: ICD-10-CM

## 2023-08-29 DIAGNOSIS — Z11.3 SCREEN FOR STD (SEXUALLY TRANSMITTED DISEASE): ICD-10-CM

## 2023-08-29 LAB
ALBUMIN SERPL BCG-MCNC: 4.7 G/DL (ref 3.5–5.2)
ALP SERPL-CCNC: 65 U/L (ref 40–129)
ALT SERPL W P-5'-P-CCNC: 51 U/L (ref 0–70)
ANION GAP SERPL CALCULATED.3IONS-SCNC: 12 MMOL/L (ref 7–15)
AST SERPL W P-5'-P-CCNC: 35 U/L (ref 0–45)
BILIRUB SERPL-MCNC: 0.4 MG/DL
BUN SERPL-MCNC: 14 MG/DL (ref 6–20)
CALCIUM SERPL-MCNC: 9.6 MG/DL (ref 8.6–10)
CHLORIDE SERPL-SCNC: 103 MMOL/L (ref 98–107)
CHOLEST SERPL-MCNC: 243 MG/DL
CREAT SERPL-MCNC: 0.84 MG/DL (ref 0.67–1.17)
DEPRECATED HCO3 PLAS-SCNC: 26 MMOL/L (ref 22–29)
GFR SERPL CREATININE-BSD FRML MDRD: >90 ML/MIN/1.73M2
GLUCOSE SERPL-MCNC: 99 MG/DL (ref 70–99)
HBA1C MFR BLD: 5.6 % (ref 0–5.6)
HBV SURFACE AB SERPL IA-ACNC: 52.24 M[IU]/ML
HBV SURFACE AB SERPL IA-ACNC: REACTIVE M[IU]/ML
HBV SURFACE AG SERPL QL IA: NONREACTIVE
HCV AB SERPL QL IA: NONREACTIVE
HDLC SERPL-MCNC: 42 MG/DL
LDLC SERPL CALC-MCNC: ABNORMAL MG/DL
LDLC SERPL DIRECT ASSAY-MCNC: 98 MG/DL
NONHDLC SERPL-MCNC: 201 MG/DL
POTASSIUM SERPL-SCNC: 4.1 MMOL/L (ref 3.4–5.3)
PROT SERPL-MCNC: 7.8 G/DL (ref 6.4–8.3)
SODIUM SERPL-SCNC: 141 MMOL/L (ref 136–145)
T PALLIDUM AB SER QL: NONREACTIVE
TRIGL SERPL-MCNC: 733 MG/DL
TSH SERPL DL<=0.005 MIU/L-ACNC: 2.35 UIU/ML (ref 0.3–4.2)

## 2023-08-29 PROCEDURE — 99395 PREV VISIT EST AGE 18-39: CPT | Performed by: FAMILY MEDICINE

## 2023-08-29 PROCEDURE — 86706 HEP B SURFACE ANTIBODY: CPT | Performed by: FAMILY MEDICINE

## 2023-08-29 PROCEDURE — 87340 HEPATITIS B SURFACE AG IA: CPT | Performed by: FAMILY MEDICINE

## 2023-08-29 PROCEDURE — 87389 HIV-1 AG W/HIV-1&-2 AB AG IA: CPT | Performed by: FAMILY MEDICINE

## 2023-08-29 PROCEDURE — 80053 COMPREHEN METABOLIC PANEL: CPT | Performed by: FAMILY MEDICINE

## 2023-08-29 PROCEDURE — 83036 HEMOGLOBIN GLYCOSYLATED A1C: CPT | Performed by: FAMILY MEDICINE

## 2023-08-29 PROCEDURE — 83721 ASSAY OF BLOOD LIPOPROTEIN: CPT | Mod: 59 | Performed by: FAMILY MEDICINE

## 2023-08-29 PROCEDURE — 36415 COLL VENOUS BLD VENIPUNCTURE: CPT | Performed by: FAMILY MEDICINE

## 2023-08-29 PROCEDURE — 87491 CHLMYD TRACH DNA AMP PROBE: CPT | Performed by: FAMILY MEDICINE

## 2023-08-29 PROCEDURE — 87591 N.GONORRHOEAE DNA AMP PROB: CPT | Performed by: FAMILY MEDICINE

## 2023-08-29 PROCEDURE — 86780 TREPONEMA PALLIDUM: CPT | Performed by: FAMILY MEDICINE

## 2023-08-29 PROCEDURE — 86803 HEPATITIS C AB TEST: CPT | Performed by: FAMILY MEDICINE

## 2023-08-29 PROCEDURE — 99213 OFFICE O/P EST LOW 20 MIN: CPT | Mod: 25 | Performed by: FAMILY MEDICINE

## 2023-08-29 PROCEDURE — 80061 LIPID PANEL: CPT | Performed by: FAMILY MEDICINE

## 2023-08-29 PROCEDURE — 84443 ASSAY THYROID STIM HORMONE: CPT | Performed by: FAMILY MEDICINE

## 2023-08-29 ASSESSMENT — ENCOUNTER SYMPTOMS
FREQUENCY: 0
SHORTNESS OF BREATH: 0
CHILLS: 0
HEADACHES: 0
HEMATOCHEZIA: 0
NERVOUS/ANXIOUS: 0
MYALGIAS: 0
DIZZINESS: 0
DYSURIA: 0
SORE THROAT: 0
ABDOMINAL PAIN: 0
DIARRHEA: 0
COUGH: 0
CONSTIPATION: 0
NAUSEA: 0
PARESTHESIAS: 0
EYE PAIN: 0
PALPITATIONS: 0
WEAKNESS: 0
HEARTBURN: 0
JOINT SWELLING: 0
ARTHRALGIAS: 0
FEVER: 0
HEMATURIA: 0

## 2023-08-29 NOTE — PROGRESS NOTES
SUBJECTIVE:   CC: Amilcar Fuentes is an 33 year old who presents for preventative health visit.       8/29/2023     8:46 AM   Additional Questions   Roomed by Anastasiia THOMPSON   Accompanied by self       Healthy Habits:     Getting at least 3 servings of Calcium per day:  NO    Bi-annual eye exam:  Yes    Dental care twice a year:  Yes    Sleep apnea or symptoms of sleep apnea:  Daytime drowsiness    Diet:  Regular (no restrictions)    Frequency of exercise:  2-3 days/week    Duration of exercise:  15-30 minutes    Taking medications regularly:  Yes    Medication side effects:  Not applicable    Additional concerns today:  Yes      Today's PHQ-2 Score:       8/28/2023     3:31 PM   PHQ-2 ( 1999 Pf[employer])   Q1: Little interest or pleasure in doing things 0   Q2: Feeling down, depressed or hopeless 0   PHQ-2 Score 0   Q1: Little interest or pleasure in doing things Not at all   Q2: Feeling down, depressed or hopeless Not at all   PHQ-2 Score 0     HPI  Intermittent lower back pain, improved with over-the-counter analgesic.  Would like to be screened for STDs, no symptoms.      {  Social History     Tobacco Use    Smoking status: Passive Smoke Exposure - Never Smoker    Smokeless tobacco: Never   Substance Use Topics    Alcohol use: Not on file             8/28/2023     3:31 PM   Alcohol Use   Prescreen: >3 drinks/day or >7 drinks/week? Yes   AUDIT SCORE  9         8/28/2023     3:31 PM   AUDIT - Alcohol Use Disorders Identification Test - Reproduced from the World Health Organization Audit 2001 (Second Edition)   1.  How often do you have a drink containing alcohol? 2 to 4 times a month   2.  How many drinks containing alcohol do you have on a typical day when you are drinking? 5 or 6   3.  How often do you have five or more drinks on one occasion? Weekly   4.  How often during the last year have you found that you were not able to stop drinking once you had started? Never   5.  How often during the last year have you failed to do what  was normally expected of you because of drinking? Never   6.  How often during the last year have you needed a first drink in the morning to get yourself going after a heavy drinking session? Never   7.  How often during the last year have you had a feeling of guilt or remorse after drinking? Never   8.  How often during the last year have you been unable to remember what happened the night before because of your drinking? Monthly   9.  Have you or someone else been injured because of your drinking? No   10. Has a relative, friend, doctor or other health care worker been concerned about your drinking or suggested you cut down? No   TOTAL SCORE 9       Last PSA: No results found for: PSA    Reviewed orders with patient. Reviewed health maintenance and updated orders accordingly - Yes  Lab work is in process  Labs reviewed in EPIC  BP Readings from Last 3 Encounters:   08/29/23 118/77   10/14/22 115/76   10/12/22 132/78    Wt Readings from Last 3 Encounters:   08/29/23 76.5 kg (168 lb 12 oz)   10/14/22 78.5 kg (173 lb)   10/12/22 79.4 kg (175 lb)                  Patient Active Problem List   Diagnosis    Hypertriglyceridemia    Lumbar disc herniation with radiculopathy at L4-L5     Past Surgical History:   Procedure Laterality Date    NO HISTORY OF SURGERY         Social History     Tobacco Use    Smoking status: Passive Smoke Exposure - Never Smoker    Smokeless tobacco: Never   Substance Use Topics    Alcohol use: Not on file     Family History   Problem Relation Age of Onset    Back Pain Father          No current outpatient medications on file.     No Known Allergies  Recent Labs   Lab Test 08/29/23  0944 05/12/21  0928   A1C 5.6  --    LDL 98 102   HDL 42 46   TRIG 733* 255*   ALT 51  --    CR 0.84  --    GFRESTIMATED >90  --    POTASSIUM 4.1  --    TSH 2.35  --         Reviewed and updated as needed this visit by clinical staff   Tobacco  Allergies  Meds              Reviewed and updated as needed this visit  "by Provider                   No past medical history on file.   Past Surgical History:   Procedure Laterality Date    NO HISTORY OF SURGERY         Review of Systems   Constitutional:  Negative for chills and fever.   HENT:  Positive for congestion. Negative for ear pain, hearing loss and sore throat.    Eyes:  Negative for pain and visual disturbance.   Respiratory:  Negative for cough and shortness of breath.    Cardiovascular:  Negative for chest pain, palpitations and peripheral edema.   Gastrointestinal:  Negative for abdominal pain, constipation, diarrhea, heartburn, hematochezia and nausea.   Genitourinary:  Negative for dysuria, frequency, genital sores, hematuria, impotence, penile discharge and urgency.   Musculoskeletal:  Negative for arthralgias, joint swelling and myalgias.   Skin:  Negative for rash.   Neurological:  Negative for dizziness, weakness, headaches and paresthesias.   Psychiatric/Behavioral:  Negative for mood changes. The patient is not nervous/anxious.          OBJECTIVE:   /77 (BP Location: Right arm, Patient Position: Sitting, Cuff Size: Adult Large)   Pulse 57   Temp 98.3  F (36.8  C) (Temporal)   Resp 16   Ht 1.632 m (5' 4.25\")   Wt 76.5 kg (168 lb 12 oz)   SpO2 97%   BMI 28.74 kg/m      Physical Exam  GENERAL: healthy, alert and no distress  EYES: Eyes grossly normal to inspection, PERRL and conjunctivae and sclerae normal  HENT: ear canals and TM's normal, nose and mouth without ulcers or lesions  NECK: no adenopathy, no asymmetry, masses, or scars and thyroid normal to palpation  RESP: lungs clear to auscultation - no rales, rhonchi or wheezes  CV: regular rate and rhythm, normal S1 S2, no S3 or S4, no murmur, click or rub, no peripheral edema and peripheral pulses strong  ABDOMEN: soft, nontender, no hepatosplenomegaly, no masses and bowel sounds normal  MS: no gross musculoskeletal defects noted, no edema  SKIN: no suspicious lesions or rashes  NEURO: Normal " "strength and tone, mentation intact and speech normal  PSYCH: mentation appears normal, affect normal/bright    Diagnostic Test Results:  Labs reviewed in Epic    ASSESSMENT/PLAN:   (Z00.00) Routine general medical examination at a health care facility  (primary encounter diagnosis)  Comment:   Plan:     (Z13.29,  Z13.21,  Z13.228,  Z13.0) Screening for endocrine, nutritional, metabolic and immunity disorder  Comment:   Plan: **Comprehensive metabolic panel FUTURE 2mo,         Lipid panel reflex to direct LDL Fasting, **TSH        with free T4 reflex FUTURE 2mo, Hemoglobin A1c,        LDL cholesterol direct            (Z11.3) Screen for STD (sexually transmitted disease)  Comment: Plan: Chlamydia & Gonorrhea by PCR, [facility] -         Clinic Collect, Treponema Abs w Reflex to RPR         and Titer, Hepatitis B surface antigen,         Hepatitis B Surface Antibody, Hepatitis C         antibody, HIV Antigen Antibody Combo Cascade            (M51.16) Lumbar disc herniation with radiculopathy at L4-L5  Comment:   Plan: History of disc hernia on previous Lumbar MRI , intermittent back pain, discussed possible referral to PT, spine clinic.    Patient has been advised of split billing requirements and indicates understanding: Yes      COUNSELING:   Reviewed preventive health counseling, as reflected in patient instructions       Regular exercise       Healthy diet/nutrition       Vision screening       Hearing screening       Alcohol Use       BMI:   Estimated body mass index is 28.74 kg/m  as calculated from the following:    Height as of this encounter: 1.632 m (5' 4.25\").    Weight as of this encounter: 76.5 kg (168 lb 12 oz).   Weight management plan: Discussed healthy diet and exercise guidelines      He reports that he is a non-smoker but has been exposed to tobacco smoke. He has never used smokeless tobacco.            Niya Pederson MD  [facility]    Prior to immunization administration, " verified patients identity using patient s name and date of birth. Please see Immunization Activity for additional information.     Screening Questionnaire for Adult Immunization    Are you sick today?   Don't Know   Do you have allergies to medications, food, a vaccine component or latex?   No   Have you ever had a serious reaction after receiving a vaccination?   No   Do you have a long-term health problem with heart, lung, kidney, or metabolic disease (e.g., diabetes), asthma, a blood disorder, no spleen, complement component deficiency, a cochlear implant, or a spinal fluid leak?  Are you on long-term aspirin therapy?   No   Do you have cancer, leukemia, HIV/AIDS, or any other immune system problem?   No   Do you have a parent, brother, or sister with an immune system problem?   No   In the past 3 months, have you taken medications that affect  your immune system, such as prednisone, other steroids, or anticancer drugs; drugs for the treatment of rheumatoid arthritis, Crohn s disease, or psoriasis; or have you had radiation treatments?   No   Have you had a seizure, or a brain or other nervous system problem?   No   During the past year, have you received a transfusion of blood or blood    products, or been given immune (gamma) globulin or antiviral drug?   No   For women: Are you pregnant or is there a chance you could become       pregnant during the next month?   No   Have you received any vaccinations in the past 4 weeks?   No     Immunization questionnaire answers were all negative.      Patient instructed to remain in clinic for 15 minutes afterwards, and to report any adverse reactions.     Screening performed by Anastasiia López MA on 8/29/2023 at 8:50 AM.

## 2023-08-30 LAB
C TRACH DNA SPEC QL PROBE+SIG AMP: NEGATIVE
HIV 1+2 AB+HIV1 P24 AG SERPL QL IA: NONREACTIVE
N GONORRHOEA DNA SPEC QL NAA+PROBE: NEGATIVE

## 2024-04-17 ENCOUNTER — OFFICE VISIT (OUTPATIENT)
Dept: FAMILY MEDICINE | Facility: CLINIC | Age: 34
End: 2024-04-17
Payer: COMMERCIAL

## 2024-04-17 VITALS
DIASTOLIC BLOOD PRESSURE: 68 MMHG | TEMPERATURE: 98 F | OXYGEN SATURATION: 98 % | HEIGHT: 64 IN | WEIGHT: 168 LBS | RESPIRATION RATE: 16 BRPM | SYSTOLIC BLOOD PRESSURE: 105 MMHG | HEART RATE: 66 BPM | BODY MASS INDEX: 28.68 KG/M2

## 2024-04-17 DIAGNOSIS — K13.0 CHEILITIS: Primary | ICD-10-CM

## 2024-04-17 PROCEDURE — 99213 OFFICE O/P EST LOW 20 MIN: CPT | Performed by: FAMILY MEDICINE

## 2024-04-17 RX ORDER — TRIAMCINOLONE ACETONIDE 0.25 MG/G
OINTMENT TOPICAL 2 TIMES DAILY
Qty: 30 G | Refills: 1 | Status: SHIPPED | OUTPATIENT
Start: 2024-04-17 | End: 2024-04-27

## 2024-04-17 ASSESSMENT — ENCOUNTER SYMPTOMS
EYE PAIN: 0
HEMATURIA: 0
SORE THROAT: 0
ARTHRALGIAS: 0
VOMITING: 0
FEVER: 0
COUGH: 0
SHORTNESS OF BREATH: 0
ABDOMINAL PAIN: 0
DYSURIA: 0
PALPITATIONS: 0
COLOR CHANGE: 0
SEIZURES: 0
CHILLS: 0
BACK PAIN: 0

## 2024-04-17 NOTE — PROGRESS NOTES
1. Cheilitis  This is a 35 yo male with burning discomfort of his lips - this is recurrent over last couple years (since having his retainers?).  Wears retainers all the time, but has had intermittent, but recurring symptoms where his lips get dry/cracked/burning and cracks in the angles.  Most consistent with cheilitis - would encourage him to take a daily multivitamin and try Triamcinolone ointment (low potency ) BID for up to 10 days.    - triamcinolone (KENALOG) 0.025 % external ointment; Apply topically 2 times daily for 10 days  Dispense: 30 g; Refill: 1      Subjective   Say Gina is a 34 year old, presenting for the following health issues:  Mouth/Lip Problem (Dry, cracked, itchy lips)        4/17/2024     2:39 PM   Additional Questions   Roomed by Arcelia     Got dry cracked lips last winter  Then back last month   Got it back this week  Started after getting retainers      History of Present Illness       Reason for visit:  Itchy lips  Symptom onset:  More than a month  Symptoms include:  Dry itchy cracked lips  Symptom intensity:  Mild  Symptom progression:  Worsening  Had these symptoms before:  Yes  Has tried/received treatment for these symptoms:  No  What makes it worse:  No  What makes it better:  Lip balm    He eats 0-1 servings of fruits and vegetables daily.He consumes 1 sweetened beverage(s) daily.He exercises with enough effort to increase his heart rate 9 or less minutes per day.  He exercises with enough effort to increase his heart rate 3 or less days per week.   He is taking medications regularly.         Review of Systems   Constitutional:  Negative for chills and fever.   HENT:  Negative for ear pain and sore throat.    Eyes:  Negative for pain and visual disturbance.   Respiratory:  Negative for cough and shortness of breath.    Cardiovascular:  Negative for chest pain and palpitations.   Gastrointestinal:  Negative for abdominal pain and vomiting.   Genitourinary:  Negative for dysuria  "and hematuria.   Musculoskeletal:  Negative for arthralgias and back pain.   Skin:  Negative for color change and rash.        Dry lips / burning    Neurological:  Negative for seizures and syncope.   All other systems reviewed and are negative.          Objective    /68 (BP Location: Left arm, Patient Position: Sitting, Cuff Size: Adult Large)   Pulse 66   Temp 98  F (36.7  C) (Temporal)   Resp 16   Ht 1.63 m (5' 4.17\")   Wt 76.2 kg (168 lb)   SpO2 98%   BMI 28.68 kg/m    Body mass index is 28.68 kg/m .  Physical Exam  Vitals reviewed.   Constitutional:       General: He is not in acute distress.     Appearance: Normal appearance.   HENT:      Head: Normocephalic.      Right Ear: Tympanic membrane, ear canal and external ear normal.      Left Ear: Tympanic membrane, ear canal and external ear normal.      Nose: Nose normal.      Mouth/Throat:      Mouth: Mucous membranes are moist.      Pharynx: No posterior oropharyngeal erythema.      Comments: No oral lesions    Eyes:      Extraocular Movements: Extraocular movements intact.      Conjunctiva/sclera: Conjunctivae normal.      Pupils: Pupils are equal, round, and reactive to light.   Cardiovascular:      Rate and Rhythm: Normal rate and regular rhythm.      Pulses: Normal pulses.   Pulmonary:      Effort: Pulmonary effort is normal.   Musculoskeletal:         General: No deformity. Normal range of motion.      Cervical back: Normal range of motion and neck supple.   Lymphadenopathy:      Cervical: No cervical adenopathy.   Skin:     General: Skin is warm and dry.      Comments: Dry lips, mild chapping at angles   Neurological:      General: No focal deficit present.      Mental Status: He is alert and oriented to person, place, and time.   Psychiatric:         Mood and Affect: Mood normal.         Behavior: Behavior normal.            No results found for this or any previous visit (from the past 24 hour(s)).        Signed Electronically by: ALMA DELIA" LEYLA ISAAC MD      Prior to immunization administration, verified patients identity using patient s name and date of birth. Please see Immunization Activity for additional information.     Screening Questionnaire for Adult Immunization    Are you sick today?   No   Do you have allergies to medications, food, a vaccine component or latex?   No   Have you ever had a serious reaction after receiving a vaccination?   No   Do you have a long-term health problem with heart, lung, kidney, or metabolic disease (e.g., diabetes), asthma, a blood disorder, no spleen, complement component deficiency, a cochlear implant, or a spinal fluid leak?  Are you on long-term aspirin therapy?   No   Do you have cancer, leukemia, HIV/AIDS, or any other immune system problem?   No   Do you have a parent, brother, or sister with an immune system problem?   No   In the past 3 months, have you taken medications that affect  your immune system, such as prednisone, other steroids, or anticancer drugs; drugs for the treatment of rheumatoid arthritis, Crohn s disease, or psoriasis; or have you had radiation treatments?   No   Have you had a seizure, or a brain or other nervous system problem?   No   During the past year, have you received a transfusion of blood or blood    products, or been given immune (gamma) globulin or antiviral drug?   No   For women: Are you pregnant or is there a chance you could become       pregnant during the next month?   No   Have you received any vaccinations in the past 4 weeks?   No     Immunization questionnaire answers were all negative.      Patient instructed to remain in clinic for 15 minutes afterwards, and to report any adverse reactions.     Screening performed by Arcelia Ferrer CMA on 4/17/2024 at 2:40 PM.

## 2024-07-30 ENCOUNTER — PATIENT OUTREACH (OUTPATIENT)
Dept: CARE COORDINATION | Facility: CLINIC | Age: 34
End: 2024-07-30
Payer: COMMERCIAL

## 2024-08-13 ENCOUNTER — PATIENT OUTREACH (OUTPATIENT)
Dept: CARE COORDINATION | Facility: CLINIC | Age: 34
End: 2024-08-13
Payer: COMMERCIAL

## 2024-11-09 ENCOUNTER — HEALTH MAINTENANCE LETTER (OUTPATIENT)
Age: 34
End: 2024-11-09